# Patient Record
Sex: FEMALE | Race: WHITE | Employment: OTHER | URBAN - METROPOLITAN AREA
[De-identification: names, ages, dates, MRNs, and addresses within clinical notes are randomized per-mention and may not be internally consistent; named-entity substitution may affect disease eponyms.]

---

## 2022-08-03 PROBLEM — I99.8 ISCHEMIA OF TOE: Status: ACTIVE | Noted: 2022-08-03

## 2022-08-03 PROBLEM — I73.9 PAD (PERIPHERAL ARTERY DISEASE) (HCC): Status: ACTIVE | Noted: 2022-08-03

## 2022-09-07 PROBLEM — I50.89 OTHER HEART FAILURE (HCC): Status: ACTIVE | Noted: 2022-09-07

## 2022-09-07 PROBLEM — R60.0 LEG EDEMA: Status: ACTIVE | Noted: 2022-09-07

## 2022-09-21 PROBLEM — I38 VHD (VALVULAR HEART DISEASE): Status: ACTIVE | Noted: 2022-09-21

## 2023-10-29 PROBLEM — I83.893 VARICOSE VEINS OF BILATERAL LOWER EXTREMITIES WITH OTHER COMPLICATIONS: Status: ACTIVE | Noted: 2023-10-29

## 2023-12-04 PROBLEM — I83.893 VARICOSE VEINS OF LOWER EXTREMITIES WITH COMPLICATIONS, BILATERAL: Status: ACTIVE | Noted: 2023-12-04

## 2024-01-17 ENCOUNTER — TELEPHONE (OUTPATIENT)
Dept: CARDIOLOGY CLINIC | Age: 89
End: 2024-01-17

## 2024-01-17 NOTE — TELEPHONE ENCOUNTER
Cardiac clearance-Dr Chapin- per the pt, she is a Dr Eubanks pt. I called Dr Chapin office left  to Iraida to send the report to Dr Eubanks office.

## 2025-06-03 ENCOUNTER — TELEPHONE (OUTPATIENT)
Dept: PHYSICAL MEDICINE AND REHAB | Age: 89
End: 2025-06-03

## 2025-07-01 ENCOUNTER — PROCEDURE VISIT (OUTPATIENT)
Dept: PHYSICAL MEDICINE AND REHAB | Age: 89
End: 2025-07-01

## 2025-07-01 DIAGNOSIS — G89.29 CHRONIC BILATERAL LOW BACK PAIN WITHOUT SCIATICA: ICD-10-CM

## 2025-07-01 DIAGNOSIS — R20.2 PARESTHESIA OF BILATERAL LEGS: ICD-10-CM

## 2025-07-01 DIAGNOSIS — M54.50 CHRONIC BILATERAL LOW BACK PAIN WITHOUT SCIATICA: ICD-10-CM

## 2025-07-01 DIAGNOSIS — G60.8 POLYNEUROPATHY, PERIPHERAL SENSORIMOTOR AXONAL: Primary | ICD-10-CM

## 2025-07-01 DIAGNOSIS — R29.898 BILATERAL LEG WEAKNESS: ICD-10-CM

## 2025-07-14 ENCOUNTER — TELEPHONE (OUTPATIENT)
Dept: NEUROSURGERY | Age: 89
End: 2025-07-14

## 2025-07-14 NOTE — TELEPHONE ENCOUNTER
Patients referring provider called and asked for the EMG results from 7/1/2025 I looked at the chart and it has not been sighed yet. I spoke with Roselyn and she stated she was going to talk with Dr. Crouch tomorrow.

## 2025-07-17 NOTE — PROGRESS NOTES
EMG REPORT     CHIEF COMPLAINT: Severe lower spine and leg pains.    HISTORY OF PRESENT ILLNESS: 89 y.o. right hand dominant female with chronic struggles with spinal and limb pains after having had compression fractures and restless legs or sometimes.  She described her pain severity as 10/10.  She has shooting pains through her legs, back pain and foot paresthesias.  Her symptoms disturb her sleep and cause her to feel unsteady with ambulation.  She did not report localized cramping or rash.  She has a history of a thyroid disorder but no diabetes (considered prediabetic, however).      PHYSICAL EXAMINATION: Alert but easily distracted.  Decreased lumbar lordosis.  Normal hip knee passive range of motion.  Negative straight leg raising.  No lower limb deep tendon reflexes but she was quite resistant to relaxing for examination.  She demonstrated giveaway with MMT across the knees and ankles with complaints of pain.  Sensation was inconsistently reported to confrontation.  Some dysesthesias were triggered with touching her feet.  EDB bulk seemed diminished bilaterally.      NERVE CONDUCTION STUDIES:     MOTOR         LATENCY NORMAL AMPLITUDE DISTANCE COND. ROJELIO.   R  PERONEAL   5.3 < 6.2 msec 1.5 8 cm 41           RIGHT  TIBIAL 6.3 < 6.2 msec 0.7 8 cm 37           R TIB H REFLEX Absent < 50 msec      L TIB H REFLEX Absent < 50 msec         SENSORY  ANTIDROMIC        LATENCY NORMAL AMPLITUDE DISTANCE   R SUP PERONEAL Absent < 3.6 msec  10 cm          RIGHT  SURAL 3.4 < 4.0 msec 5 14 cm   LEFT  SURAL 3.6 < 4.0 msec 4 14 cm         NEEDLE EMG:      RIGHT   LEFT     Insertional Activity Spontaneous  Activity Volutional  MUAP's Insertional Activity Spontaneous  Activity Volutional  MUAP's   Lumbar paraspinals Normal None Normal Normal None Normal   Glut Med Normal None Normal Normal None Normal   Rect fem Normal None Normal Normal None Normal   Vast Med Normal None Normal Normal None Normal   Ant Tibialis Normal None